# Patient Record
Sex: MALE | Race: OTHER | Employment: UNEMPLOYED | URBAN - NONMETROPOLITAN AREA
[De-identification: names, ages, dates, MRNs, and addresses within clinical notes are randomized per-mention and may not be internally consistent; named-entity substitution may affect disease eponyms.]

---

## 2023-10-22 ENCOUNTER — HOSPITAL ENCOUNTER (EMERGENCY)
Age: 28
Discharge: HOME | End: 2023-10-22
Attending: EMERGENCY MEDICINE

## 2023-10-22 VITALS
HEART RATE: 63 BPM | DIASTOLIC BLOOD PRESSURE: 85 MMHG | HEIGHT: 65 IN | BODY MASS INDEX: 29.38 KG/M2 | WEIGHT: 176.37 LBS | SYSTOLIC BLOOD PRESSURE: 123 MMHG | RESPIRATION RATE: 18 BRPM | TEMPERATURE: 97.8 F | OXYGEN SATURATION: 96 %

## 2023-10-22 DIAGNOSIS — M25.561 ACUTE PAIN OF RIGHT KNEE: Primary | ICD-10-CM

## 2023-10-22 LAB
ALBUMIN SERPL BCG-MCNC: 4.4 G/DL (ref 3.5–5.1)
ALP SERPL-CCNC: 94 U/L (ref 38–126)
ALT SERPL W/O P-5'-P-CCNC: 78 U/L (ref 11–66)
ANION GAP SERPL CALC-SCNC: 12 MEQ/L (ref 8–16)
AST SERPL-CCNC: 31 U/L (ref 5–40)
BASOPHILS ABSOLUTE: 0.1 THOU/MM3 (ref 0–0.1)
BASOPHILS NFR BLD AUTO: 0.5 %
BILIRUB SERPL-MCNC: 0.3 MG/DL (ref 0.3–1.2)
BUN SERPL-MCNC: 13 MG/DL (ref 7–22)
CALCIUM SERPL-MCNC: 9.5 MG/DL (ref 8.5–10.5)
CHLORIDE SERPL-SCNC: 98 MEQ/L (ref 98–111)
CO2 SERPL-SCNC: 25 MEQ/L (ref 23–33)
CREAT SERPL-MCNC: 0.6 MG/DL (ref 0.4–1.2)
DEPRECATED RDW RBC AUTO: 40 FL (ref 35–45)
EOSINOPHIL NFR BLD AUTO: 2.9 %
EOSINOPHILS ABSOLUTE: 0.3 THOU/MM3 (ref 0–0.4)
ERYTHROCYTE [DISTWIDTH] IN BLOOD BY AUTOMATED COUNT: 12.1 % (ref 11.5–14.5)
GFR SERPL CREATININE-BSD FRML MDRD: > 60 ML/MIN/1.73M2
GLUCOSE SERPL-MCNC: 143 MG/DL (ref 70–108)
HCT VFR BLD AUTO: 47.6 % (ref 42–52)
HGB BLD-MCNC: 15.7 GM/DL (ref 14–18)
IMM GRANULOCYTES # BLD AUTO: 0.06 THOU/MM3 (ref 0–0.07)
IMM GRANULOCYTES NFR BLD AUTO: 0.5 %
LYMPHOCYTES ABSOLUTE: 3.1 THOU/MM3 (ref 1–4.8)
LYMPHOCYTES NFR BLD AUTO: 26.7 %
MCH RBC QN AUTO: 30.1 PG (ref 26–33)
MCHC RBC AUTO-ENTMCNC: 33 GM/DL (ref 32.2–35.5)
MCV RBC AUTO: 91.4 FL (ref 80–94)
MONOCYTES ABSOLUTE: 0.6 THOU/MM3 (ref 0.4–1.3)
MONOCYTES NFR BLD AUTO: 5.4 %
NEUTROPHILS NFR BLD AUTO: 64 %
NRBC BLD AUTO-RTO: 0 /100 WBC
OSMOLALITY SERPL CALC.SUM OF ELEC: 272.7 MOSMOL/KG (ref 275–300)
PLATELET # BLD AUTO: 270 THOU/MM3 (ref 130–400)
PMV BLD AUTO: 9.3 FL (ref 9.4–12.4)
POTASSIUM SERPL-SCNC: 3.9 MEQ/L (ref 3.5–5.2)
PROT SERPL-MCNC: 7.3 G/DL (ref 6.1–8)
RBC # BLD AUTO: 5.21 MILL/MM3 (ref 4.7–6.1)
SEGMENTED NEUTROPHILS ABSOLUTE COUNT: 7.5 THOU/MM3 (ref 1.8–7.7)
SODIUM SERPL-SCNC: 135 MEQ/L (ref 135–145)
URATE SERPL-MCNC: 5.9 MG/DL (ref 3.7–7)
WBC # BLD AUTO: 11.7 THOU/MM3 (ref 4.8–10.8)

## 2023-10-22 PROCEDURE — 84550 ASSAY OF BLOOD/URIC ACID: CPT

## 2023-10-22 PROCEDURE — 6370000000 HC RX 637 (ALT 250 FOR IP): Performed by: EMERGENCY MEDICINE

## 2023-10-22 PROCEDURE — 99283 EMERGENCY DEPT VISIT LOW MDM: CPT

## 2023-10-22 PROCEDURE — 85025 COMPLETE CBC W/AUTO DIFF WBC: CPT

## 2023-10-22 PROCEDURE — 36415 COLL VENOUS BLD VENIPUNCTURE: CPT

## 2023-10-22 PROCEDURE — 80053 COMPREHEN METABOLIC PANEL: CPT

## 2023-10-22 RX ORDER — IBUPROFEN 200 MG
400 TABLET ORAL ONCE
Status: COMPLETED | OUTPATIENT
Start: 2023-10-22 | End: 2023-10-22

## 2023-10-22 RX ORDER — COLCHICINE 0.6 MG/1
0.6 TABLET ORAL DAILY
Qty: 30 TABLET | Refills: 3 | Status: SHIPPED | OUTPATIENT
Start: 2023-10-22

## 2023-10-22 RX ORDER — ALLOPURINOL 300 MG/1
300 TABLET ORAL DAILY
Qty: 90 TABLET | Refills: 1 | Status: SHIPPED | OUTPATIENT
Start: 2023-10-22

## 2023-10-22 RX ADMIN — IBUPROFEN 400 MG: 200 TABLET, FILM COATED ORAL at 16:35

## 2023-10-22 ASSESSMENT — PAIN SCALES - GENERAL: PAINLEVEL_OUTOF10: 9

## 2023-10-22 ASSESSMENT — ENCOUNTER SYMPTOMS
VOMITING: 0
NAUSEA: 0
DIARRHEA: 0
ABDOMINAL PAIN: 0
COUGH: 0

## 2023-10-22 ASSESSMENT — PAIN DESCRIPTION - ORIENTATION: ORIENTATION: RIGHT

## 2023-10-22 ASSESSMENT — PAIN - FUNCTIONAL ASSESSMENT: PAIN_FUNCTIONAL_ASSESSMENT: 0-10

## 2023-10-22 ASSESSMENT — PAIN DESCRIPTION - LOCATION: LOCATION: KNEE

## 2023-10-22 NOTE — ED NOTES
Patient medicated per [facility] at this time and updated on plan of care. Respirations even and unlabored at this time.       Maia Brown RN  10/22/23 7143

## 2023-10-22 NOTE — ED NOTES
Patient resting in bed, respirations even and unlabored at this time.      Fred Hebert RN  10/22/23 8007

## 2023-10-22 NOTE — ED PROVIDER NOTES
[facility]          Pt Name: Nelly Ramirez  MRN: [id number]  [address] 1995  Date of evaluation: 10/22/2023  Treating Resident Physician: Malgorzata Edward DO  Supervising Physician: Areli Luis MD    History obtained from the patient. CHIEF COMPLAINT       Chief Complaint   Patient presents with    Knee Pain    Gout           HISTORY OF PRESENT ILLNESS    HPI  Adali Barrios is a 28 y.o. male with a history of gout who presents to the emergency department for evaluation of right knee pain x3 days. History is limited as patient is Panamanian-speaking. He states he has a history of gout and feels like this is a flareup. He has been on allopurinol and colchicine in the past.  He is able to ambulate and has good range of motion of the right knee. Denies any fevers, chills. The patient has no other acute complaints at this time. REVIEW OF SYSTEMS   Review of Systems   Constitutional:  Negative for chills and fever. Respiratory:  Negative for cough. Cardiovascular:  Negative for chest pain and palpitations. Gastrointestinal:  Negative for abdominal pain, diarrhea, nausea and vomiting. Genitourinary:  Negative for dysuria and hematuria. Musculoskeletal:         Knee pain   Skin:  Negative for rash. Neurological:  Negative for dizziness, light-headedness and headaches. All other systems reviewed and are negative. PAST MEDICAL AND SURGICAL HISTORY   History reviewed. No pertinent past medical history. History reviewed. No pertinent surgical history. MEDICATIONS   No current facility-administered medications for this encounter.     Current Outpatient Medications:     allopurinol (ZYLOPRIM) 300 MG tablet, Take 1 tablet by mouth daily, Disp: 90 tablet, Rfl: 1    colchicine (COLCRYS) 0.6 MG tablet, Take 1 tablet by mouth daily, Disp: 30 tablet, Rfl: 3      SOCIAL HISTORY     Social History     Social History

## 2023-10-22 NOTE — ED NOTES
Patient resting in bed, respirations even and unlabored at this time.       Terrence Millan RN  10/22/23 2242

## 2023-10-22 NOTE — ED TRIAGE NOTES
Patient presents to ED from home with report os a hx of gout and right knee pain. Patient states he has had this before and the pain started up again 3 days ago. Patient states his current pain is a 9/10 in his right knee. Patient is A/O x4 and ambulates to room 30 without difficulty. Patient is Togolese speaking and  is at bedside.

## 2023-10-22 NOTE — ED PROVIDER NOTES

## 2024-01-21 ENCOUNTER — HOSPITAL ENCOUNTER (EMERGENCY)
Age: 29
Discharge: HOME | End: 2024-01-21

## 2024-01-21 VITALS
TEMPERATURE: 97.8 F | RESPIRATION RATE: 16 BRPM | WEIGHT: 176.37 LBS | BODY MASS INDEX: 29.35 KG/M2 | OXYGEN SATURATION: 94 % | DIASTOLIC BLOOD PRESSURE: 72 MMHG | HEART RATE: 69 BPM | SYSTOLIC BLOOD PRESSURE: 140 MMHG

## 2024-01-21 DIAGNOSIS — M10.071 ACUTE IDIOPATHIC GOUT INVOLVING TOE OF RIGHT FOOT: Primary | ICD-10-CM

## 2024-01-21 PROCEDURE — 99283 EMERGENCY DEPT VISIT LOW MDM: CPT

## 2024-01-21 RX ORDER — COLCHICINE 0.6 MG/1
0.6 TABLET ORAL DAILY
Qty: 30 TABLET | Refills: 3 | Status: SHIPPED | OUTPATIENT
Start: 2024-01-21

## 2024-01-21 NOTE — ED TRIAGE NOTES
Pt to ED with right foot pain. Pt states he was seen here for this a few weeks ago, was prescribed allopurinol. Pt states that his prescription ran out a few weeks ago. Pt states that he is having pain in the foot the last few days.

## 2024-01-21 NOTE — ED PROVIDER NOTES
[facility] EMERGENCY DEPT      EMERGENCY MEDICINE     Pt Name: Adali Ramírez  MRN: [id number]  Birthdate 1995  Date of evaluation: 1/21/2024  Provider: HECTOR Graham    CHIEF COMPLAINT       Chief Complaint   Patient presents with    Foot Swelling     HISTORY OF PRESENT ILLNESS   Adali Ramírez is a pleasant 28 y.o. male who presents to the emergency department from from home, by private vehicle for evaluation of \complains of right great toe swelling this been going on for about a week.  The patient's has a history of gout last episode was about 3 months ago.  The patient states that he had no injury.  He speaks only Vincentian and the video  was used provided by the emergency department.        PASTMEDICAL HISTORY   No past medical history on file.    There is no problem list on file for this patient.    SURGICAL HISTORY     No past surgical history on file.    CURRENT MEDICATIONS       Discharge Medication List as of 1/21/2024  2:04 PM        CONTINUE these medications which have NOT CHANGED    Details   allopurinol (ZYLOPRIM) 300 MG tablet Take 1 tablet by mouth daily, Disp-90 tablet, R-1Normal             ALLERGIES     has No Known Allergies.    FAMILY HISTORY     has no family status information on file.        SOCIAL HISTORY       Social History     Tobacco Use    Smoking status: Some Days     Types: Cigarettes   Substance Use Topics    Alcohol use: Not Currently    Drug use: Not Currently       PHYSICAL EXAM       ED Triage Vitals [01/21/24 1300]   BP Temp Temp src Pulse Respirations SpO2 Height Weight - Scale   (!) 140/72 -- -- 69 16 94 % -- 80 kg (176 lb 5.9 oz)       Additional Vital Signs:  Vitals:    01/21/24 1300   BP: (!) 140/72   Pulse: 69   Resp: 16   Temp: 97.8 °F (36.6 °C)   SpO2: 94%     Physical Exam  Vitals and nursing note reviewed.   HENT:      Head: Normocephalic and atraumatic.   Eyes:      Pupils: Pupils are equal, round, and reactive to light.   Neck: